# Patient Record
(demographics unavailable — no encounter records)

---

## 2024-11-14 NOTE — HISTORY OF PRESENT ILLNESS
[No change in the review of systems as noted in prior visit date ___] : No change in the review of systems as noted in prior visit date of [unfilled] [de-identified] : History of CP s/p BMT (2/2024) 2 recent ear infections - responded to drops No otorrhea

## 2024-11-14 NOTE — PHYSICAL EXAM
[Placement/Patency] : tympanostomy tube in place and patent [Clear/Ventilated] : middle ear clear and well ventilated [1+] : 1+ [Increased Work of Breathing] : no increased work of breathing with use of accessory muscles and retractions [Normal muscle strength, symmetry and tone of facial, head and neck musculature] : normal muscle strength, symmetry and tone of facial, head and neck musculature [Normal] : no cervical lymphadenopathy [de-identified] : CL/CP s/p repair

## 2024-11-18 NOTE — ASSESSMENT
[FreeTextEntry1] : This young man comes today accompanied by his mother and father regarding the diagnosis of right idiopathic clubfoot deformity currently being managed in nighttime Ponseti braces.   INTERVAL HISTORY:  Grant has been doing very well.  He has been meeting developmental motor milestones and has been ambulatory.  The patient's mother and father reported and had emailed to me that the braces appear to be getting tight.  There was some mild skin irritation over the left foot, however, the right idiopathic clubfoot side has had no skin irritation issues.  We obtained insurance authorization and are ready to present the new set of AFO braces.   Since the day of the last evaluation, there has been no significant change in past medical or social history.   Review of systems today is negative for fever, chills, chest pain, shortness of breath, or rashes.   PHYSICAL EXAMINATION: On physical examination today, Grant is semi-cooperative with the exam.  He is kicking fully.  He has had no recurrence of the clubfoot deformity on the right side.  Leg length inequality of about 1 cm.  The patient has neutral dorsiflexion with stimulation of tibialis anterior.  There is no recurrence of cavus and the hindfoot appears to be more or less in a neutral positioning.  There is no evidence of recurrence of metatarsus adductus.  With the leg in full extension there is about 10 degrees of dorsiflexion about neutral, 30 when the knee is flexed with sensation grossly intact to light touch and capillary refill less than 2 seconds.  Today, the fit of the Nikhil brace was assessed.  The new AFOs were applied size 4 and appeared to be appropriately fitting.  I demarcated the strap lines for appropriate tensioning of the straps.   ASSESSMENT/PLAN: Grant is an [id number] little boy who has the diagnosis of right idiopathic clubfoot deformity, which is currently benefitting from nighttime Ponseti brace wear.  The patient is doing exceptionally well with compliance and there is no evidence of recurrence.  I reviewed the exam with Grant's mother and father who act as independent historians given the child's pediatric age.  I have no concerns and would continue to encourage them to use the braces as much as possible to prevent recurrence.  I will see this little boy back in approximately four months for clinical reassessment.  All questions were answered to satisfaction today.  Grant's mother and father expressed understanding and agree.

## 2024-11-18 NOTE — ASSESSMENT
[FreeTextEntry1] : This young man comes today accompanied by his mother and father regarding the diagnosis of right idiopathic clubfoot deformity currently being managed in nighttime Ponseti braces.   INTERVAL HISTORY:  Grant has been doing very well.  He has been meeting developmental motor milestones and has been ambulatory.  The patient's mother and father reported and had emailed to me that the braces appear to be getting tight.  There was some mild skin irritation over the left foot, however, the right idiopathic clubfoot side has had no skin irritation issues.  We obtained insurance authorization and are ready to present the new set of AFO braces.   Since the day of the last evaluation, there has been no significant change in past medical or social history.   Review of systems today is negative for fever, chills, chest pain, shortness of breath, or rashes.   PHYSICAL EXAMINATION: On physical examination today, Grant is semi-cooperative with the exam.  He is kicking fully.  He has had no recurrence of the clubfoot deformity on the right side.  Leg length inequality of about 1 cm.  The patient has neutral dorsiflexion with stimulation of tibialis anterior.  There is no recurrence of cavus and the hindfoot appears to be more or less in a neutral positioning.  There is no evidence of recurrence of metatarsus adductus.  With the leg in full extension there is about 10 degrees of dorsiflexion about neutral, 30 when the knee is flexed with sensation grossly intact to light touch and capillary refill less than 2 seconds.  Today, the fit of the Nikhil brace was assessed.  The new AFOs were applied size 4 and appeared to be appropriately fitting.  I demarcated the strap lines for appropriate tensioning of the straps.   ASSESSMENT/PLAN: Grant is an 52-smtko-hzt little boy who has the diagnosis of right idiopathic clubfoot deformity, which is currently benefitting from nighttime Ponseti brace wear.  The patient is doing exceptionally well with compliance and there is no evidence of recurrence.  I reviewed the exam with Grant's mother and father who act as independent historians given the child's pediatric age.  I have no concerns and would continue to encourage them to use the braces as much as possible to prevent recurrence.  I will see this little boy back in approximately four months for clinical reassessment.  All questions were answered to satisfaction today.  Grant's mother and father expressed understanding and agree.

## 2025-05-01 NOTE — PLAN
[TextEntry] : 1. No planned genetics follow up at this time.  Genetics re-evaluation recommended if any new concerns arise.  2. Parents have an appointment on 5/19/25 to update universal carrier screening.

## 2025-05-01 NOTE — HISTORY OF PRESENT ILLNESS
[de-identified] : Grant originally presented to our medical genetics team in November 2023 due to a history of cleft lip, cleft palate, and clubfoot affecting the right lower extremity. He underwent surgery to repair the cleft lip and palate and has been recovering well since. His mother reported one hospitalization for an infection related to palate surgery but otherwise noted significant improvement and states that Grant is doing well overall.  Parents report that Grant is doing well overall and has not developed any new symptoms. The only additional note at this time is that he is a picky eater. Grant is meeting all developmental milestones and is doing well in , where he attends an upper infant class with a 1:4 ratio. He is described as very sociable and talkative, and he can walk and jump.  Grant has graduated from PT and currently receives speech therapy once per week through early intervention (EI). He continues to follow regularly with his pediatrician and orthopedist. He is on a 6-month follow-up basis with his plastic surgeon.   During his initial visit, whole genome sequencing (WGS) and mitochondrial DNA analysis were performed. 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 This VUS does not establish a molecular diagnosis of a mitochondrial DNA disorder in this proband. It remains classified as a variant of uncertain significance, and Grant does not currently exhibit symptoms associated with mitochondrial disease (e.g., seizures or significant developmental delays).

## 2025-05-01 NOTE — PHYSICAL EXAM
[Normal] : no rash, no stigmata of neurocutaneous disease [Muscle tone/ strength] : muscle tone/ strength is normal [Moving all four extremities symmetrically] : moving all four extremities symmetrically [de-identified] : mild mid face hypoplasia [de-identified] : hooded eyes

## 2025-05-01 NOTE — CONSULT LETTER
[Dear  ___] : Dear  [unfilled], [Consult Letter:] : I had the pleasure of evaluating your patient, [unfilled]. [Please see my note below.] : Please see my note below. [Consult Closing:] : Thank you very much for allowing me to participate in the care of this patient.  If you have any questions, please do not hesitate to contact me. [Sincerely,] : Sincerely, [FreeTextEntry3] :  Estephanie Richards MD, PhD Clinical  [facility], Division of Medical Genetics and Human Genomics

## 2025-05-08 NOTE — CONSULT LETTER
[Courtesy Letter:] : I had the pleasure of seeing your patient, [unfilled], in my office today. [Sincerely,] : Sincerely, [FreeTextEntry2] : [facility] [address] [address] [FreeTextEntry3] : Yefri Nicholas MD Chief, Pediatric Otolaryngology [facility] and Emily Lopez [facility] Professor of Otolaryngology [employer] at [facility]

## 2025-05-08 NOTE — PHYSICAL EXAM
[Placement/Patency] : tympanostomy tube in place and patent [Clear/Ventilated] : middle ear clear and well ventilated [1+] : 1+ [Increased Work of Breathing] : no increased work of breathing with use of accessory muscles and retractions [Normal muscle strength, symmetry and tone of facial, head and neck musculature] : normal muscle strength, symmetry and tone of facial, head and neck musculature [Normal] : no cervical lymphadenopathy [de-identified] : CL/CP s/p repair

## 2025-05-08 NOTE — HISTORY OF PRESENT ILLNESS
[No Personal or Family History of Easy Bruising, Bleeding, or Issues with General Anesthesia] : No Personal or Family History of easy bruising, bleeding, or issues with general anesthesia [No change in the review of systems as noted in prior visit date ___] : No change in the review of systems as noted in prior visit date of [unfilled] [de-identified] : History of CP s/p BMT (2/2024) 1 recent ear infection treated with drops No otorrhea +Chronic nasal congestion for 3-6 months

## 2025-06-26 NOTE — ASSESSMENT
[FreeTextEntry1] : This young man comes today accompanied by his mother regarding the diagnosis of right idiopathic clubfoot deformity currently being managed in nighttime Ponseti braces.   INTERVAL HISTORY:  Grant has been doing very well.  He has been meeting developmental motor milestones and has been ambulatory.  He is tolerating his Ponseti AFOs with bar and they are still fitting well. He is here for routine f/u. The mother has no concerns.   Since the day of the last evaluation, there has been no significant change in past medical or social history.   Review of systems today is negative for fever, chills, chest pain, shortness of breath, or rashes.   PHYSICAL EXAMINATION: On physical examination today, Grant is cooperative with the exam.  He is kicking fully.  He has had no recurrence of the clubfoot deformity on the right side.  Leg length inequality of about 1 cm.  The patient has neutral dorsiflexion with stimulation of tibialis anterior.  There is no recurrence of cavus and the hindfoot appears to be more or less in a neutral positioning.  There is no evidence of recurrence of metatarsus adductus.  With the leg in full extension there is about 20 degrees of dorsiflexion about neutral, 30 when the knee is flexed with sensation grossly intact to light touch and capillary refill less than 2 seconds.  Today, the fit of the Nikhil brace was assessed. There is a small area of redness on the left heel. The brace on left is getting small.    ASSESSMENT/PLAN: Grant is a 2 year-old little boy who has the diagnosis of right idiopathic clubfoot deformity, which is currently benefitting from nighttime Ponseti brace wear.  The patient is doing exceptionally well with compliance and there is no evidence of recurrence.  I reviewed the exam with Grant's mother who act as independent historians given the child's pediatric age.  I have no concerns and would continue to encourage her to use the braces as much as possible to prevent recurrence. We will reach out to [facility] and provide new rx for Ponseti AFOs as he is outgrowing the current pair.  I will see this little boy back in approximately four months for clinical reassessment.  All questions were answered to satisfaction today.  Grant's mother expressed understanding and agrees. I, Sheri Archibald, MPAS, PAC, have acted as a scribe and documented the above for Dr. Madrid.  The above documentation completed by the scribe is an accurate record of both my words and actions.  NICK

## 2025-07-09 NOTE — ASSESSMENT
[FreeTextEntry1] : PEDIATRIC SPEECH EVALUATION REPORT   Type of Evaluation & Procedure Code:    Evaluation of Speech Sound Production - CPT code 15115    Patient Name:Grant Mayberry    Date of Exam: 7/72025   YOB: 2023   Referring Physician: Darius Lopes NP    Referring Physician Specialty: Nurse Practioner    Medical Diagnosis: Cleft palate (Q35.9)   Treatment Diagnosis:  Articulation Disorder   Date of onset: Per report, 1 year    REASON FOR REFERRAL: Jefe Burns, a 2 year old male, was referred to assess articulation abilities given concerns for velopharyngeal dysfunction. The patient was accompanied to the evaluation by her mother, who provided the case history information, and served as a reliable informant.    PRIMARY LANGUAGE:   Reported Primary Language: English    BIRTH & MEDICAL HISTORY: Birth and Medical history gathered via parent interview and through review of electronic medical record.Patient born 40 weeks and prenatally diagnosed with unilateral cleft lip status post repair 8/2023 and status post status post BMT 02/2024. Patients medical history significant for macrocephaly, GERD, Chronic dysfunction of both eustachian tubes, Clubfoot of right lower extremity, and Conductive hearing loss of both ears. Patient does not experience chronic nasal congestion in the absence of illness. Patient reportdley with no chewing or swallowing issues. Mother reports nasal regurgitation only with "sauces."    Birth History: Delivery reportedly complicated by Post partum hemorrhage; born 40 weeks full term vaginally. 2 day NICU stay reportedly to monitor oxygen levels   Hearing acuity: No concerns   History of intubation:None   Current Respiratory Status:Room Air   Medications:Current medication use denied   Medical allergies:No known Medical allergies reported   Food allergies:No known food allergies reported    DEVELOPMENTAL MILESTONES:   Speech and language milestones were reported by mother    Acquisition of speech and language milestones were reported to be within age appropriate limits.    Acquisition of fine and gross motor development milestones were reported to be within age appropriate limits.     FAMILIAL& INTERVENTION HISTORY:   There is reportedly a negative familial history of speech and language disorders. Patient reportedly participates in Speech Therapy through Early Intervention services 1x per week.   ORAL MOTOR ASSESSMENT: A cursory oral mechanism examination of was conducted. Patient presented with facial symmetry and a predominantly closed mouth posture while at rest. Patient observed with a small alveolar fistula. Palate appeared with good length and elevation. Uvula observed at midline. Buccal tone was noted to be within functional limits. Appropriate elevation, depression, protrusion, and lateralization of lingual movements. Appropriate labial protrusion and retraction noted. Coordinated labial and lingual movements with intact strength and range of motion demonstrated. Oromotor movements appeared intact for speech production purposes.    BEHAVIORAL OBSERVATIONS: Patient presented with initial hesitancy to participate in therapeutic probes as marked by not answering evaluators questions and hiding hands in mother's lap. Mother reported this may be due to upper respiratory infection. Therefore, patient benefitted from play based informal observation to elicit a speech sample.   MOTOR SPEECH/ARTICULATION:   Informal articulation testing revealed:     Error Analysis in words:    Initial word position   /m/ for /p/    /n/ for /k/   /w/ for /k/   Omission of /p/   Omission of /m/   Omission of /z/   Omission of /f/   Omission of /s/   Omission of /b/    Medial word position:    /m/ for /n/     Final word position:  Frequent final consonant deletion      Interpretation:   Patient presents with a moderate articulation disorder characterized by substitutions, omissions and the phonological processes of final and initial consonant deletion resulting in a speech intelligibility of 25%. Given patients age of 2 some errors were noted to be developmental for phonemes /z,f,s/. However, errors on phonemes /m,n,b/ are not typical for a patient his age. The phonological process of final consonant deletion is typical for a patient his age while the process of initial consonant deletion is not. Patient benefitted from tactile and verbal cueing to achieve correct production of age-appropriate phonemes during assessment and presented as stimulable.    PERCEPTUAL VOICE ANALYSIS:   Vocal Quality:Within functional limits   Loudness Level:Can demonstrate control of loudness    Pitch: Within functional limits   Musculoskeletal Tension:Absent    Resonance:Mild Hypernasality. Please note, mother reported patient with upper respiratory infection.  Tone Focus: Within functional limits    Laryngeal Palpation:Within functional limits     VELOPHARYNGEAL FUNCTION    Function: VPI is defined as the presence of nasal air emission upon production of the 16 pressure consonants /p, b, t, d, k, g, s, z, f, v, sh, zh, ch, dj, th, voiced th/ and/or hypernasal resonance of vowels and vocalic consonants /l, r, w, j/. Patient with absence of nasal air emissions, appropriate strength of pressure consonants, appropriate volume and hypernasal resonance. Given the above, suspect velopharyngeal mislearning and not velopharyngeal dysfunction at this time.     VELO (VPI Effects on Life Outcome)   Patient's mother completed the VELO (VPI Effects on Life Outcome) survey to evaluate impact of VPI in every day life. Maximum score is 100 for each subscale and the total score with a higher score indicating less impact on quality of life.  Scores were as follows:    Speech Limitations:  Parent - 5   Swallowing Problems:  Parent - 3   Situational Difficulties:  Parent - 10   Emotional Impact:  Parent - 4   Perception by Others:  Parent - 1   Caregiver Impact:  Parent - 4   Total Score:  Parent -27     In regard to the area of  situation difficulties, parent reports that patient often/almost always has problems with: ---Speech is difficult for strangers/friends, and family to understand    -Patient has difficulty being understood when not speaking to face to face     In regard to the area of speech limitations, parent reports that patient often/almost always has problems with:    -Difficulty speaking in long sentences    -Speech sounds different than other kids    Interpretation:   Upon clinician probing, mother endorses mild-moderate impact on life. VELO results are consistent with the parent's report.    IMPRESSIONS:    Jefe Burns, a 2 year old male, was referred to assess articulation abilities given concerns for velopharyngeal dysfunction. Patient presents with a moderate articulation disorder characterized by substitutions, omissions and the phonological processes of final and initial consonant deletion resulting in a speech intelligibility of 25%. Given patients age of 2 some errors were noted to be developmental for phonemes /z,f,s/. However, errors on phonemes /m,n,b/ are not typical for a patient his age. The phonological process of final consonant deletion is typical for a patient his age while the process of initial consonant deletion is not. Patient with absence of nasal air emissions, appropriate strength of pressure consonants, appropriate volume and hypernasal resonance. Given the above, suspect velopharyngeal mislearning and not velopharyngeal dysfunction at this time. Based upon the evaluation results it is recommended that Deacon be enrolled in a course of outpatient speech therapy to address the above areas of concern and assess if improvement in overall articulation and intelligibility can be achieved.    PROGNOSIS   Prognosis for improvement with treatment is good given consistent parent and patient involvement.    EDUCATION:  Mother provided education on evaluation results. Mother provided written recommendations for home therapist.   RECOMMENDATIONS: It is recommended patient be enrolled in a course of outpatient speech therapy. Mother declined at time of evaluation due to preference to utilize Early Intervention provider.  This referral process was reviewed with the  family .No further recommendations are made at this time. The patient will be referred back to their otolaryngologist for a follow-up laryngeal evaluation upon the completion of therapy. Please contact the Center should you have any additional questions or concerns, at [phone].   Clinician Name & Credentials Yamini Reddy MS, CCC-SLP, TSSLD    [facility] License #[id number]

## 2025-07-09 NOTE — ASSESSMENT
[FreeTextEntry1] : PEDIATRIC SPEECH EVALUATION REPORT   Type of Evaluation & Procedure Code:    Evaluation of Speech Sound Production - CPT code 56679    Patient Name:Grant Mayberry    Date of Exam: 7/72025   YOB: 2023   Referring Physician: Darius Lopes NP    Referring Physician Specialty: Nurse Practioner    Medical Diagnosis: Cleft palate (Q35.9)   Treatment Diagnosis:  Articulation Disorder   Date of onset: Per report, 1 year    REASON FOR REFERRAL: Jefe Burns, a 2 year old male, was referred to assess articulation abilities given concerns for velopharyngeal dysfunction. The patient was accompanied to the evaluation by her mother, who provided the case history information, and served as a reliable informant.    PRIMARY LANGUAGE:   Reported Primary Language: English    BIRTH & MEDICAL HISTORY: Birth and Medical history gathered via parent interview and through review of electronic medical record.Patient born 40 weeks and prenatally diagnosed with unilateral cleft lip status post repair 8/2023 and status post status post BMT 02/2024. Patients medical history significant for macrocephaly, GERD, Chronic dysfunction of both eustachian tubes, Clubfoot of right lower extremity, and Conductive hearing loss of both ears. Patient does not experience chronic nasal congestion in the absence of illness. Patient reportdley with no chewing or swallowing issues. Mother reports nasal regurgitation only with "sauces."    Birth History: Delivery reportedly complicated by Post partum hemorrhage; born 40 weeks full term vaginally. 2 day NICU stay reportedly to monitor oxygen levels   Hearing acuity: No concerns   History of intubation:None   Current Respiratory Status:Room Air   Medications:Current medication use denied   Medical allergies:No known Medical allergies reported   Food allergies:No known food allergies reported    DEVELOPMENTAL MILESTONES:   Speech and language milestones were reported by mother    Acquisition of speech and language milestones were reported to be within age appropriate limits.    Acquisition of fine and gross motor development milestones were reported to be within age appropriate limits.     FAMILIAL& INTERVENTION HISTORY:   There is reportedly a negative familial history of speech and language disorders. Patient reportedly participates in Speech Therapy through Early Intervention services 1x per week.   ORAL MOTOR ASSESSMENT: A cursory oral mechanism examination of was conducted. Patient presented with facial symmetry and a predominantly closed mouth posture while at rest. Patient observed with a small alveolar fistula. Palate appeared with good length and elevation. Uvula observed at midline. Buccal tone was noted to be within functional limits. Appropriate elevation, depression, protrusion, and lateralization of lingual movements. Appropriate labial protrusion and retraction noted. Coordinated labial and lingual movements with intact strength and range of motion demonstrated. Oromotor movements appeared intact for speech production purposes.    BEHAVIORAL OBSERVATIONS: Patient presented with initial hesitancy to participate in therapeutic probes as marked by not answering evaluators questions and hiding hands in mother's lap. Mother reported this may be due to upper respiratory infection. Therefore, patient benefitted from play based informal observation to elicit a speech sample.   MOTOR SPEECH/ARTICULATION:   Informal articulation testing revealed:     Error Analysis in words:    Initial word position   /m/ for /p/    /n/ for /k/   /w/ for /k/   Omission of /p/   Omission of /m/   Omission of /z/   Omission of /f/   Omission of /s/   Omission of /b/    Medial word position:    /m/ for /n/     Final word position:  Frequent final consonant deletion      Interpretation:   Patient presents with a moderate articulation disorder characterized by substitutions, omissions and the phonological processes of final and initial consonant deletion resulting in a speech intelligibility of 25%. Given patients age of 2 some errors were noted to be developmental for phonemes /z,f,s/. However, errors on phonemes /m,n,b/ are not typical for a patient his age. The phonological process of final consonant deletion is typical for a patient his age while the process of initial consonant deletion is not. Patient benefitted from tactile and verbal cueing to achieve correct production of age-appropriate phonemes during assessment and presented as stimulable.    PERCEPTUAL VOICE ANALYSIS:   Vocal Quality:Within functional limits   Loudness Level:Can demonstrate control of loudness    Pitch: Within functional limits   Musculoskeletal Tension:Absent    Resonance:Mild Hypernasality. Please note, mother reported patient with upper respiratory infection.  Tone Focus: Within functional limits    Laryngeal Palpation:Within functional limits     VELOPHARYNGEAL FUNCTION    Function: VPI is defined as the presence of nasal air emission upon production of the 16 pressure consonants /p, b, t, d, k, g, s, z, f, v, sh, zh, ch, dj, th, voiced th/ and/or hypernasal resonance of vowels and vocalic consonants /l, r, w, j/. Patient with absence of nasal air emissions, appropriate strength of pressure consonants, appropriate volume and hypernasal resonance. Given the above, suspect velopharyngeal mislearning and not velopharyngeal dysfunction at this time.     VELO (VPI Effects on Life Outcome)   Patient's mother completed the VELO (VPI Effects on Life Outcome) survey to evaluate impact of VPI in every day life. Maximum score is 100 for each subscale and the total score with a higher score indicating less impact on quality of life.  Scores were as follows:    Speech Limitations:  Parent - 5   Swallowing Problems:  Parent - 3   Situational Difficulties:  Parent - 10   Emotional Impact:  Parent - 4   Perception by Others:  Parent - 1   Caregiver Impact:  Parent - 4   Total Score:  Parent -27     In regard to the area of  situation difficulties, parent reports that patient often/almost always has problems with: ---Speech is difficult for strangers/friends, and family to understand    -Patient has difficulty being understood when not speaking to face to face     In regard to the area of speech limitations, parent reports that patient often/almost always has problems with:    -Difficulty speaking in long sentences    -Speech sounds different than other kids    Interpretation:   Upon clinician probing, mother endorses mild-moderate impact on life. VELO results are consistent with the parent's report.    IMPRESSIONS:    Jefe Burns, a 2 year old male, was referred to assess articulation abilities given concerns for velopharyngeal dysfunction. Patient presents with a moderate articulation disorder characterized by substitutions, omissions and the phonological processes of final and initial consonant deletion resulting in a speech intelligibility of 25%. Given patients age of 2 some errors were noted to be developmental for phonemes /z,f,s/. However, errors on phonemes /m,n,b/ are not typical for a patient his age. The phonological process of final consonant deletion is typical for a patient his age while the process of initial consonant deletion is not. Patient with absence of nasal air emissions, appropriate strength of pressure consonants, appropriate volume and hypernasal resonance. Given the above, suspect velopharyngeal mislearning and not velopharyngeal dysfunction at this time. Based upon the evaluation results it is recommended that Deacon be enrolled in a course of outpatient speech therapy to address the above areas of concern and assess if improvement in overall articulation and intelligibility can be achieved.    PROGNOSIS   Prognosis for improvement with treatment is good given consistent parent and patient involvement.    EDUCATION:  Mother provided education on evaluation results. Mother provided written recommendations for home therapist.   RECOMMENDATIONS: It is recommended patient be enrolled in a course of outpatient speech therapy. Mother declined at time of evaluation due to preference to utilize Early Intervention provider.  This referral process was reviewed with the  family .No further recommendations are made at this time. The patient will be referred back to their otolaryngologist for a follow-up laryngeal evaluation upon the completion of therapy. Please contact the Center should you have any additional questions or concerns, at [phone].   Clinician Name & Credentials Yamini Reddy MS, CCC-SLP, TSSLD    [facility] License #[id number]